# Patient Record
Sex: MALE | Race: ASIAN | NOT HISPANIC OR LATINO | URBAN - METROPOLITAN AREA
[De-identification: names, ages, dates, MRNs, and addresses within clinical notes are randomized per-mention and may not be internally consistent; named-entity substitution may affect disease eponyms.]

---

## 2024-01-04 RX ORDER — SODIUM CHLORIDE 9 MG/ML
250 INJECTION INTRAMUSCULAR; INTRAVENOUS; SUBCUTANEOUS ONCE
Refills: 0 | Status: COMPLETED | OUTPATIENT
Start: 2024-01-10 | End: 2024-01-10

## 2024-01-04 RX ORDER — CHOLECALCIFEROL (VITAMIN D3) 125 MCG
1 CAPSULE ORAL
Refills: 0 | DISCHARGE

## 2024-01-04 RX ORDER — SODIUM CHLORIDE 9 MG/ML
500 INJECTION INTRAMUSCULAR; INTRAVENOUS; SUBCUTANEOUS
Refills: 0 | Status: DISCONTINUED | OUTPATIENT
Start: 2024-01-10 | End: 2024-01-24

## 2024-01-04 RX ORDER — CLOPIDOGREL BISULFATE 75 MG/1
600 TABLET, FILM COATED ORAL ONCE
Refills: 0 | Status: COMPLETED | OUTPATIENT
Start: 2024-01-10 | End: 2024-01-10

## 2024-01-04 RX ORDER — CHLORHEXIDINE GLUCONATE 213 G/1000ML
1 SOLUTION TOPICAL ONCE
Refills: 0 | Status: DISCONTINUED | OUTPATIENT
Start: 2024-01-10 | End: 2024-01-24

## 2024-01-04 NOTE — H&P ADULT - NSHPLABSRESULTS_GEN_ALL_CORE
16.6   8.58  )-----------( 226      ( 10 Frederick 2024 14:20 )             49.0       01-10    142  |  106  |  22  ----------------------------<  121<H>  3.7   |  23  |  1.37<H>    Ca    9.5      10 Frederick 2024 14:20    TPro  7.4  /  Alb  4.2  /  TBili  0.6  /  DBili  x   /  AST  24  /  ALT  17  /  AlkPhos  81  01-10      PT/INR - ( 10 Frederick 2024 14:20 )   PT: 10.6 sec;   INR: 0.93          PTT - ( 10 Frederick 2024 14:20 )  PTT:34.2 sec    CARDIAC MARKERS ( 10 Frederick 2024 14:20 )  x     / x     / 70 U/L / x     / 2.0 ng/mL        Urinalysis Basic - ( 10 Frederick 2024 14:20 )    Color: x / Appearance: x / SG: x / pH: x  Gluc: 121 mg/dL / Ketone: x  / Bili: x / Urobili: x   Blood: x / Protein: x / Nitrite: x   Leuk Esterase: x / RBC: x / WBC x   Sq Epi: x / Non Sq Epi: x / Bacteria: x        EKG: NSR, non ischemic

## 2024-01-04 NOTE — H&P ADULT - NSICDXPASTMEDICALHX_GEN_ALL_CORE_FT
PAST MEDICAL HISTORY:  Hyperlipidemia     Hypertension      PAST MEDICAL HISTORY:  Gastric cancer     Hyperlipidemia     Hypertension

## 2024-01-04 NOTE — H&P ADULT - HISTORY OF PRESENT ILLNESS
[facility]     Cardiologist:  Dr. John Chance   Escort: To be determined on arrival   Pharmacy:  [facility]     CONFIRM MEDICATIONS UPON ARRIVAL     Patient is a 79 y/o F,  who presented to her cardiologist, Dr. Chance, with complaints of intermittent, non-radiating, substernal chest pain, that has been intermittent, but gradually worsening over the past few weeks, occurring with moderate exertion (walking less than 2 blocks or climbing less than 1 flight of stairs), and alleviated with rest. Patient also complains of  moderate dyspnea for the past 1 month precipitated by light exertion and alleviated by rest. Patient also complains of dizziness that is gradually worsening described as lightheadedness.   Patient denies SOB, palpitations, fatigue, dizziness, headache, chills, orthopnea, LE edema, cough, PND, recent travel, or sick contacts.   ECHO 12/17/23:  LA/RA size is normal. LVSF is normal. RVSF is normal. Mild AI. Trace MR. Trace TR. EF: 60%. NST 12/11/2023: Overall quality of the test is good. A moderate perfusion abnormality of mild intensity is present in the inferior and inferolateral regions on the stress images which were reversible in the rest images.     In light of patient's risk factors, CCS Class III anginal symptoms, and abnormal NST, patient presents for left heart cardiac catheterization with possible intervention if clinically indicated.  [facility]     Cardiologist:  Dr. John Chance   Escort: To be determined on arrival   Pharmacy:  [facility]     CONFIRM MEDICATIONS UPON ARRIVAL     Patient is a 77 y/o F,  who presented to her cardiologist, Dr. Chance, with complaints of intermittent, non-radiating, substernal chest pain, that has been intermittent, but gradually worsening over the past few weeks, occurring with moderate exertion (walking less than 2 blocks or climbing less than 1 flight of stairs), and alleviated with rest. Patient also complains of  moderate dyspnea for the past 1 month precipitated by light exertion and alleviated by rest. Patient also complains of dizziness that is gradually worsening described as lightheadedness.   Patient denies SOB, palpitations, fatigue, dizziness, headache, chills, orthopnea, LE edema, cough, PND, recent travel, or sick contacts.   ECHO 12/17/23:  LA/RA size is normal. LVSF is normal. RVSF is normal. Mild AI. Trace MR. Trace TR. EF: 60%. NST 12/11/2023: Overall quality of the test is good. A moderate perfusion abnormality of mild intensity is present in the inferior and inferolateral regions on the stress images which were reversible in the rest images.     In light of patient's risk factors, CCS Class III anginal symptoms, and abnormal NST, patient presents for left heart cardiac catheterization with possible intervention if clinically indicated.  Cardiologist - Dr. John Chance  Pharmacy - [facility]   Escort -     78F, __ speaking, w/ PMHx of HTN, and HLD, initially presented to outpt cardiologist c/o intermittent, non-radiating, substernal chest pressure, that has been gradually worsening over the past few weeks, occurring w/ moderate exertion and alleviated with rest. Pt also endorses of  mod dyspnea for the past 1 month precipitated by light exertion and alleviated by rest. Pt also c/o dizziness that is gradually worsening described as lightheadedness. She denies any palpitations, syncope, diaphoresis, fatigue, LE edema, orthopnea, PND, N/V, fever, chills or recent sick contact.     NST 12/11/2023: mod ischemia in inferior and inferolateral regions, reversible at rest. TTE 12/17/23: LVEF normal, no significant valvular dz    In light of patient's risk factors, CCS Class III anginal symptoms, and abnormal NST, patient presents for left heart cardiac catheterization with possible intervention if clinically indicated.  Cardiologist - Dr. John Chance  Pharmacy - [facility]   Escort -     78F, __ speaking, w/ PMHx of HTN, and HLD, initially presented to outpt cardiologist c/o intermittent, non-radiating, substernal chest pressure, that has been gradually worsening over the past few weeks, occurring w/ moderate exertion and alleviated with rest. Pt also endorses of  mod dyspnea for the past 1 month precipitated by light exertion and alleviated by rest. Pt also c/o dizziness that is gradually worsening described as lightheadedness. She denies any palpitations, syncope, diaphoresis, fatigue, LE edema, orthopnea, PND, N/V, fever, chills or recent sick contact.     NST 12/11/2023: mod ischemia in inferior and inferolateral regions, reversible at rest. TTE 12/17/23: LVEF normal, no significant valvular dz    In light of patient's risk factors, CCS Class III anginal symptoms, and abnormal NST, patient presents for left heart cardiac catheterization with possible intervention if clinically indicated.  Cardiologist - Dr. John Chanec  Pharmacy - [facility]   Escort -     78F, __ speaking, w/ PMHx of HTN, HLD, and Gastric CA s/p partial gastrectomy/chemo/radiation (2008 @ [facility]), initially presented to outpt cardiologist c/o intermittent, non-radiating, substernal chest pressure, that has been gradually worsening over the past few weeks, occurring w/ moderate exertion and alleviated with rest. Pt also endorses of  mod dyspnea for the past 1 month precipitated by light exertion and alleviated by rest. Pt also c/o dizziness that is gradually worsening described as lightheadedness. She denies any palpitations, syncope, diaphoresis, fatigue, LE edema, orthopnea, PND, N/V, fever, chills or recent sick contact.     NST 12/11/2023: mod ischemia in inferior and inferolateral regions, reversible at rest. TTE 12/17/23: LVEF normal, no significant valvular dz    In light of patient's risk factors, CCS Class III anginal symptoms, and abnormal NST, patient presents for left heart cardiac catheterization with possible intervention if clinically indicated.  Cardiologist - Dr. John Chance  Pharmacy - [facility]   Escort -     78F, __ speaking, w/ PMHx of HTN, HLD, and Gastric CA s/p partial gastrectomy/chemo/radiation (2008 @ [facility]), initially presented to outpt cardiologist c/o intermittent, non-radiating, substernal chest pressure, that has been gradually worsening over the past few weeks, occurring w/ moderate exertion and alleviated with rest. Pt also endorses of  mod dyspnea for the past 1 month precipitated by light exertion and alleviated by rest. Pt also c/o dizziness that is gradually worsening described as lightheadedness. She denies any palpitations, syncope, diaphoresis, fatigue, LE edema, orthopnea, PND, N/V, fever, chills or recent sick contact.     NST 12/11/2023: mod ischemia in inferior and inferolateral regions, reversible at rest. TTE 12/17/23: LVEF normal, no significant valvular dz    In light of patient's risk factors, CCS Class III anginal symptoms, and abnormal NST, patient presents for left heart cardiac catheterization with possible intervention if clinically indicated.  Cardiologist - Dr. John Chance  Pharmacy - [facility]   Escort -     78F, __ speaking, w/ PMHx of HTN, HLD, and Gastric CA s/p partial gastrectomy/chemo/radiation (2008 @ [facility]), initially presented to outpt cardiologist c/o intermittent, non-radiating, substernal chest pressure, that has been gradually worsening over the past few weeks, occurring w/ moderate exertion and alleviated with rest. Pt also endorses of  mod dyspnea for the past 1 month precipitated by light exertion and alleviated by rest. Pt also c/o dizziness that is gradually worsening described as lightheadedness. She denies any palpitations, syncope, diaphoresis, fatigue, LE edema, orthopnea, PND, N/V, fever, chills or recent sick contact.     NST 12/11/2023: mod ischemia in inferior and inferolateral regions, reversible at rest.  TTE 12/17/23: LVEF normal, no significant valvular dz    In light of patient's risk factors, CCS Class III anginal symptoms, and abnormal NST, patient presents for left heart cardiac catheterization with possible intervention if clinically indicated.  Cardiologist - Dr. John Chance  Pharmacy - [facility]   Escort -     78F, __ speaking, w/ PMHx of HTN, HLD, and Gastric CA s/p partial gastrectomy/chemo/radiation (2008 @ [facility]), initially presented to outpt cardiologist c/o intermittent, non-radiating, substernal chest pressure, that has been gradually worsening over the past few weeks, occurring w/ moderate exertion and alleviated with rest. Pt also endorses of  mod dyspnea for the past 1 month precipitated by light exertion and alleviated by rest. Pt also c/o dizziness that is gradually worsening described as lightheadedness. She denies any palpitations, syncope, diaphoresis, fatigue, LE edema, orthopnea, PND, N/V, fever, chills or recent sick contact.     NST 12/11/2023: mod ischemia in inferior and inferolateral regions, reversible at rest.  TTE 12/17/23: LVEF normal, no significant valvular dz    In light of patient's risk factors, CCS Class III anginal symptoms, and abnormal NST, patient presents for left heart cardiac catheterization with possible intervention if clinically indicated.  Cardiologist - Dr. John Chance  Pharmacy - [facility] [phone]  Escort - Family    78M, Cantonese speaking, w/ PMHx of HTN, HLD, Gastric CA s/p partial gastrectomy/chemo/radiation (2008 @ [facility]), and CKD-III (most recent Cr 1.38 on 12/18/23), initially presented to outpt cardiologist c/o intermittent, non-radiating, substernal chest pressure, that has been gradually worsening over the past few weeks, occurring w/ moderate exertion and alleviated with rest. Pt also endorses of  mod dyspnea for the past 1 month precipitated by light exertion and alleviated by rest. Pt also c/o dizziness that is gradually worsening described as lightheadedness. He denies any palpitations, syncope, diaphoresis, fatigue, LE edema, orthopnea, PND, N/V, fever, chills or recent sick contact.     NST 12/11/2023: mod ischemia in inferior and inferolateral regions, reversible at rest.  TTE 12/17/23: LVEF normal, no significant valvular dz    In light of patient's risk factors, CCS Class III anginal symptoms, and abnormal NST, patient presents for left heart cardiac catheterization with possible intervention if clinically indicated.  Cardiologist - Dr. John Chance  Pharmacy - [facility] [phone]  Escort - Family    78M, Cantonese speaking, w/ PMHx of HTN, HLD, Gastric CA s/p partial gastrectomy/chemo/radiation (2008 @ [facility]), and CKD-III (most recent Cr 1.38 on 12/18/23), initially presented to outpt cardiologist c/o intermittent, non-radiating, substernal chest pressure, that has been gradually worsening over the past few weeks, occurring w/ moderate exertion and alleviated with rest. Pt also endorses of  mod dyspnea for the past 1 month precipitated by light exertion and alleviated by rest. Pt also c/o dizziness that is gradually worsening described as lightheadedness. He denies any palpitations, syncope, diaphoresis, fatigue, LE edema, orthopnea, PND, N/V, fever, chills or recent sick contact.     NST 12/11/2023: mod ischemia in inferior and inferolateral regions, reversible at rest.  TTE 12/17/23: LVEF normal, no significant valvular dz    In light of patient's risk factors, CCS Class III anginal symptoms, and abnormal NST, patient presents for left heart cardiac catheterization with possible intervention if clinically indicated.

## 2024-01-04 NOTE — H&P ADULT - ASSESSMENT
78F, __ speaking, w/ PMHx of HTN, HLD, and Gastric CA s/p partial gastrectomy/chemo/radiation (2008 @ [facility]), now presents to [facility] for recommended cardiac cath w/ possible intervention if clinically indicated, in light of pts risk factors, CCS class III anginal symptoms and abnormal NST.    - ASA 3, Mallampati 3  - Pt compliant w/ home ASA 81mg, last dose ____. Load w/ Plavix 600mg prior to cath.  - Pre cath IVF Hydration: NS 250cc bolus then c/w maintenance NS @ 75cc/hr x 2hrs  - Pre cath consented    Risks & benefits of procedure and alternative therapy have been explained to the patient including but not limited to: allergic reaction, bleeding w/possible need for blood transfusion, infection, renal and vascular compromise, limb damage, arrhythmia, stroke, vessel dissection/perforation, Myocardial infarction, emergent CABG. Informed consent obtained and in chart.  78F, __ speaking, w/ PMHx of HTN, HLD, and Gastric CA s/p partial gastrectomy/chemo/radiation (2008 @ [facility]), now presents to [facility] for recommended cardiac cath w/ possible intervention if clinically indicated, in light of pts risk factors, CCS class III anginal symptoms and abnormal NST.    - ASA 3, Mallampati 3  - Pt compliant w/ home ASA 81mg, last dose ____. Load w/ Plavix 600mg prior to cath.  - Pre cath IVF Hydration: NS 250cc bolus then c/w maintenance NS @ 75cc/hr x 2hrs  - Pre cath consented    Risks & benefits of procedure and alternative therapy have been explained to the patient including but not limited to: allergic reaction, bleeding w/possible need for blood transfusion, infection, renal and vascular compromise, limb damage, arrhythmia, stroke, vessel dissection/perforation, Myocardial infarction, emergent CABG. Informed consent obtained and in chart.  78M, Cantonese speaking, w/ PMHx of HTN, HLD, Gastric CA s/p partial gastrectomy/chemo/radiation (2008 @ [facility]), and CKD-III (most recent Cr 1.38 on 12/18/23), now presents to [facility] for recommended cardiac cath w/ possible intervention if clinically indicated, in light of pts risk factors, CCS class III anginal symptoms and abnormal NST.    - ASA 3, Mallampati 3  - Pt compliant w/ home ASA 81mg, last dose 1/10/24. Load w/ Plavix 600mg prior to cath.  - Pre cath IVF Hydration: NS 250cc bolus then c/w maintenance NS @ 75cc/hr x 2hrs  - Pre cath consented via language line solutions #[id number]    Risks & benefits of procedure and alternative therapy have been explained to the patient including but not limited to: allergic reaction, bleeding w/possible need for blood transfusion, infection, renal and vascular compromise, limb damage, arrhythmia, stroke, vessel dissection/perforation, Myocardial infarction, emergent CABG. Informed consent obtained and in chart.  78M, Cantonese speaking, w/ PMHx of HTN, HLD, Gastric CA s/p partial gastrectomy/chemo/radiation (2008 @ [facility]), and CKD-III (most recent Cr 1.38 on 12/18/23), now presents to [facility] for recommended cardiac cath w/ possible intervention if clinically indicated, in light of pts risk factors, CCS class III anginal symptoms and abnormal NST.    - ASA 3, Mallampati 3  - Pt compliant w/ home ASA 81mg, last dose 1/10/24. Load w/ Plavix 600mg prior to cath.  - Pre cath IVF Hydration: NS 250cc bolus then c/w maintenance NS @ 75cc/hr x 2hrs  - Pre cath consented via language line solutions #[id number]    Risks & benefits of procedure and alternative therapy have been explained to the patient including but not limited to: allergic reaction, bleeding w/possible need for blood transfusion, infection, renal and vascular compromise, limb damage, arrhythmia, stroke, vessel dissection/perforation, Myocardial infarction, emergent CABG. Informed consent obtained and in chart.  78M, Cantonese speaking, w/ PMHx of HTN, HLD, Gastric CA s/p partial gastrectomy/chemo/radiation (2008 @ [facility]), and CKD-III (most recent Cr 1.38 on 12/18/23), now presents to [facility] for recommended cardiac cath w/ possible intervention if clinically indicated, in light of pts risk factors, CCS class III anginal symptoms and abnormal NST.    - ASA 3, Mallampati 3  - Pt compliant w/ home ASA 81mg, last dose 1/10/24. Load w/ Plavix 600mg prior to cath.  - Cr 1.37 (at baseline), pre cath IVF Hydration: NS 250cc bolus then c/w maintenance NS @ 75cc/hr x 2hrs  - Pre cath consented via language line solutions #[id number]    Risks & benefits of procedure and alternative therapy have been explained to the patient including but not limited to: allergic reaction, bleeding w/possible need for blood transfusion, infection, renal and vascular compromise, limb damage, arrhythmia, stroke, vessel dissection/perforation, Myocardial infarction, emergent CABG. Informed consent obtained and in chart.  78M, Cantonese speaking, w/ PMHx of HTN, HLD, Gastric CA s/p partial gastrectomy/chemo/radiation (2008 @ [facility]), and CKD-III (most recent Cr 1.38 on 12/18/23), now presents to [facility] for recommended cardiac cath w/ possible intervention if clinically indicated, in light of pts risk factors, CCS class III anginal symptoms and abnormal NST.    - ASA 3, Mallampati 3  - Pt compliant w/ home ASA 81mg, last dose 1/10/24. Load w/ Plavix 600mg prior to cath.  - Cr 1.37 (at baseline), pre cath IVF Hydration: NS 250cc bolus then c/w maintenance NS @ 75cc/hr x 2hrs  - Pre cath consented via language line solutions #[id number]    Risks & benefits of procedure and alternative therapy have been explained to the patient including but not limited to: allergic reaction, bleeding w/possible need for blood transfusion, infection, renal and vascular compromise, limb damage, arrhythmia, stroke, vessel dissection/perforation, Myocardial infarction, emergent CABG. Informed consent obtained and in chart.

## 2024-01-10 ENCOUNTER — OUTPATIENT (OUTPATIENT)
Dept: OUTPATIENT SERVICES | Facility: HOSPITAL | Age: 79
LOS: 1 days | Discharge: HOME | End: 2024-01-10
Payer: MEDICARE

## 2024-01-10 DIAGNOSIS — Z90.3 ACQUIRED ABSENCE OF STOMACH [PART OF]: Chronic | ICD-10-CM

## 2024-01-10 LAB
A1C WITH ESTIMATED AVERAGE GLUCOSE RESULT: 6 % — HIGH (ref 4–5.6)
A1C WITH ESTIMATED AVERAGE GLUCOSE RESULT: 6 % — HIGH (ref 4–5.6)
ALBUMIN SERPL ELPH-MCNC: 4.2 G/DL — SIGNIFICANT CHANGE UP (ref 3.3–5)
ALBUMIN SERPL ELPH-MCNC: 4.2 G/DL — SIGNIFICANT CHANGE UP (ref 3.3–5)
ALP SERPL-CCNC: 81 U/L — SIGNIFICANT CHANGE UP (ref 40–120)
ALP SERPL-CCNC: 81 U/L — SIGNIFICANT CHANGE UP (ref 40–120)
ALT FLD-CCNC: 17 U/L — SIGNIFICANT CHANGE UP (ref 10–45)
ALT FLD-CCNC: 17 U/L — SIGNIFICANT CHANGE UP (ref 10–45)
ANION GAP SERPL CALC-SCNC: 13 MMOL/L — SIGNIFICANT CHANGE UP (ref 5–17)
ANION GAP SERPL CALC-SCNC: 13 MMOL/L — SIGNIFICANT CHANGE UP (ref 5–17)
APTT BLD: 34.2 SEC — SIGNIFICANT CHANGE UP (ref 24.5–35.6)
APTT BLD: 34.2 SEC — SIGNIFICANT CHANGE UP (ref 24.5–35.6)
AST SERPL-CCNC: 24 U/L — SIGNIFICANT CHANGE UP (ref 10–40)
AST SERPL-CCNC: 24 U/L — SIGNIFICANT CHANGE UP (ref 10–40)
BASOPHILS # BLD AUTO: 0.04 K/UL — SIGNIFICANT CHANGE UP (ref 0–0.2)
BASOPHILS # BLD AUTO: 0.04 K/UL — SIGNIFICANT CHANGE UP (ref 0–0.2)
BASOPHILS NFR BLD AUTO: 0.5 % — SIGNIFICANT CHANGE UP (ref 0–2)
BASOPHILS NFR BLD AUTO: 0.5 % — SIGNIFICANT CHANGE UP (ref 0–2)
BILIRUB SERPL-MCNC: 0.6 MG/DL — SIGNIFICANT CHANGE UP (ref 0.2–1.2)
BILIRUB SERPL-MCNC: 0.6 MG/DL — SIGNIFICANT CHANGE UP (ref 0.2–1.2)
BUN SERPL-MCNC: 22 MG/DL — SIGNIFICANT CHANGE UP (ref 7–23)
BUN SERPL-MCNC: 22 MG/DL — SIGNIFICANT CHANGE UP (ref 7–23)
CALCIUM SERPL-MCNC: 9.5 MG/DL — SIGNIFICANT CHANGE UP (ref 8.4–10.5)
CALCIUM SERPL-MCNC: 9.5 MG/DL — SIGNIFICANT CHANGE UP (ref 8.4–10.5)
CHLORIDE SERPL-SCNC: 106 MMOL/L — SIGNIFICANT CHANGE UP (ref 96–108)
CHLORIDE SERPL-SCNC: 106 MMOL/L — SIGNIFICANT CHANGE UP (ref 96–108)
CHOLEST SERPL-MCNC: 162 MG/DL — SIGNIFICANT CHANGE UP
CHOLEST SERPL-MCNC: 162 MG/DL — SIGNIFICANT CHANGE UP
CK MB CFR SERPL CALC: 2 NG/ML — SIGNIFICANT CHANGE UP (ref 0–6.7)
CK MB CFR SERPL CALC: 2 NG/ML — SIGNIFICANT CHANGE UP (ref 0–6.7)
CK SERPL-CCNC: 70 U/L — SIGNIFICANT CHANGE UP (ref 30–200)
CK SERPL-CCNC: 70 U/L — SIGNIFICANT CHANGE UP (ref 30–200)
CO2 SERPL-SCNC: 23 MMOL/L — SIGNIFICANT CHANGE UP (ref 22–31)
CO2 SERPL-SCNC: 23 MMOL/L — SIGNIFICANT CHANGE UP (ref 22–31)
CREAT SERPL-MCNC: 1.37 MG/DL — HIGH (ref 0.5–1.3)
CREAT SERPL-MCNC: 1.37 MG/DL — HIGH (ref 0.5–1.3)
EGFR: 53 ML/MIN/1.73M2 — LOW
EGFR: 53 ML/MIN/1.73M2 — LOW
EOSINOPHIL # BLD AUTO: 0.06 K/UL — SIGNIFICANT CHANGE UP (ref 0–0.5)
EOSINOPHIL # BLD AUTO: 0.06 K/UL — SIGNIFICANT CHANGE UP (ref 0–0.5)
EOSINOPHIL NFR BLD AUTO: 0.7 % — SIGNIFICANT CHANGE UP (ref 0–6)
EOSINOPHIL NFR BLD AUTO: 0.7 % — SIGNIFICANT CHANGE UP (ref 0–6)
ESTIMATED AVERAGE GLUCOSE: 126 MG/DL — HIGH (ref 68–114)
ESTIMATED AVERAGE GLUCOSE: 126 MG/DL — HIGH (ref 68–114)
GLUCOSE SERPL-MCNC: 121 MG/DL — HIGH (ref 70–99)
GLUCOSE SERPL-MCNC: 121 MG/DL — HIGH (ref 70–99)
HCT VFR BLD CALC: 49 % — SIGNIFICANT CHANGE UP (ref 39–50)
HCT VFR BLD CALC: 49 % — SIGNIFICANT CHANGE UP (ref 39–50)
HCV AB S/CO SERPL IA: 0.03 S/CO — SIGNIFICANT CHANGE UP
HCV AB S/CO SERPL IA: 0.03 S/CO — SIGNIFICANT CHANGE UP
HCV AB SERPL-IMP: SIGNIFICANT CHANGE UP
HCV AB SERPL-IMP: SIGNIFICANT CHANGE UP
HDLC SERPL-MCNC: 68 MG/DL — SIGNIFICANT CHANGE UP
HDLC SERPL-MCNC: 68 MG/DL — SIGNIFICANT CHANGE UP
HGB BLD-MCNC: 16.6 G/DL — SIGNIFICANT CHANGE UP (ref 13–17)
HGB BLD-MCNC: 16.6 G/DL — SIGNIFICANT CHANGE UP (ref 13–17)
IMM GRANULOCYTES NFR BLD AUTO: 0.6 % — SIGNIFICANT CHANGE UP (ref 0–0.9)
IMM GRANULOCYTES NFR BLD AUTO: 0.6 % — SIGNIFICANT CHANGE UP (ref 0–0.9)
INR BLD: 0.93 — SIGNIFICANT CHANGE UP (ref 0.85–1.18)
INR BLD: 0.93 — SIGNIFICANT CHANGE UP (ref 0.85–1.18)
LIPID PNL WITH DIRECT LDL SERPL: 81 MG/DL — SIGNIFICANT CHANGE UP
LIPID PNL WITH DIRECT LDL SERPL: 81 MG/DL — SIGNIFICANT CHANGE UP
LYMPHOCYTES # BLD AUTO: 2.25 K/UL — SIGNIFICANT CHANGE UP (ref 1–3.3)
LYMPHOCYTES # BLD AUTO: 2.25 K/UL — SIGNIFICANT CHANGE UP (ref 1–3.3)
LYMPHOCYTES # BLD AUTO: 26.2 % — SIGNIFICANT CHANGE UP (ref 13–44)
LYMPHOCYTES # BLD AUTO: 26.2 % — SIGNIFICANT CHANGE UP (ref 13–44)
MCHC RBC-ENTMCNC: 31.4 PG — SIGNIFICANT CHANGE UP (ref 27–34)
MCHC RBC-ENTMCNC: 31.4 PG — SIGNIFICANT CHANGE UP (ref 27–34)
MCHC RBC-ENTMCNC: 33.9 GM/DL — SIGNIFICANT CHANGE UP (ref 32–36)
MCHC RBC-ENTMCNC: 33.9 GM/DL — SIGNIFICANT CHANGE UP (ref 32–36)
MCV RBC AUTO: 92.8 FL — SIGNIFICANT CHANGE UP (ref 80–100)
MCV RBC AUTO: 92.8 FL — SIGNIFICANT CHANGE UP (ref 80–100)
MONOCYTES # BLD AUTO: 0.67 K/UL — SIGNIFICANT CHANGE UP (ref 0–0.9)
MONOCYTES # BLD AUTO: 0.67 K/UL — SIGNIFICANT CHANGE UP (ref 0–0.9)
MONOCYTES NFR BLD AUTO: 7.8 % — SIGNIFICANT CHANGE UP (ref 2–14)
MONOCYTES NFR BLD AUTO: 7.8 % — SIGNIFICANT CHANGE UP (ref 2–14)
NEUTROPHILS # BLD AUTO: 5.51 K/UL — SIGNIFICANT CHANGE UP (ref 1.8–7.4)
NEUTROPHILS # BLD AUTO: 5.51 K/UL — SIGNIFICANT CHANGE UP (ref 1.8–7.4)
NEUTROPHILS NFR BLD AUTO: 64.2 % — SIGNIFICANT CHANGE UP (ref 43–77)
NEUTROPHILS NFR BLD AUTO: 64.2 % — SIGNIFICANT CHANGE UP (ref 43–77)
NON HDL CHOLESTEROL: 94 MG/DL — SIGNIFICANT CHANGE UP
NON HDL CHOLESTEROL: 94 MG/DL — SIGNIFICANT CHANGE UP
NRBC # BLD: 0 /100 WBCS — SIGNIFICANT CHANGE UP (ref 0–0)
NRBC # BLD: 0 /100 WBCS — SIGNIFICANT CHANGE UP (ref 0–0)
PLATELET # BLD AUTO: 226 K/UL — SIGNIFICANT CHANGE UP (ref 150–400)
PLATELET # BLD AUTO: 226 K/UL — SIGNIFICANT CHANGE UP (ref 150–400)
POTASSIUM SERPL-MCNC: 3.7 MMOL/L — SIGNIFICANT CHANGE UP (ref 3.5–5.3)
POTASSIUM SERPL-MCNC: 3.7 MMOL/L — SIGNIFICANT CHANGE UP (ref 3.5–5.3)
POTASSIUM SERPL-SCNC: 3.7 MMOL/L — SIGNIFICANT CHANGE UP (ref 3.5–5.3)
POTASSIUM SERPL-SCNC: 3.7 MMOL/L — SIGNIFICANT CHANGE UP (ref 3.5–5.3)
PROT SERPL-MCNC: 7.4 G/DL — SIGNIFICANT CHANGE UP (ref 6–8.3)
PROT SERPL-MCNC: 7.4 G/DL — SIGNIFICANT CHANGE UP (ref 6–8.3)
PROTHROM AB SERPL-ACNC: 10.6 SEC — SIGNIFICANT CHANGE UP (ref 9.5–13)
PROTHROM AB SERPL-ACNC: 10.6 SEC — SIGNIFICANT CHANGE UP (ref 9.5–13)
RBC # BLD: 5.28 M/UL — SIGNIFICANT CHANGE UP (ref 4.2–5.8)
RBC # BLD: 5.28 M/UL — SIGNIFICANT CHANGE UP (ref 4.2–5.8)
RBC # FLD: 14 % — SIGNIFICANT CHANGE UP (ref 10.3–14.5)
RBC # FLD: 14 % — SIGNIFICANT CHANGE UP (ref 10.3–14.5)
SODIUM SERPL-SCNC: 142 MMOL/L — SIGNIFICANT CHANGE UP (ref 135–145)
SODIUM SERPL-SCNC: 142 MMOL/L — SIGNIFICANT CHANGE UP (ref 135–145)
TRIGL SERPL-MCNC: 66 MG/DL — SIGNIFICANT CHANGE UP
TRIGL SERPL-MCNC: 66 MG/DL — SIGNIFICANT CHANGE UP
WBC # BLD: 8.58 K/UL — SIGNIFICANT CHANGE UP (ref 3.8–10.5)
WBC # BLD: 8.58 K/UL — SIGNIFICANT CHANGE UP (ref 3.8–10.5)
WBC # FLD AUTO: 8.58 K/UL — SIGNIFICANT CHANGE UP (ref 3.8–10.5)
WBC # FLD AUTO: 8.58 K/UL — SIGNIFICANT CHANGE UP (ref 3.8–10.5)

## 2024-01-10 PROCEDURE — 93454 CORONARY ARTERY ANGIO S&I: CPT | Mod: 26

## 2024-01-10 RX ORDER — ASPIRIN/CALCIUM CARB/MAGNESIUM 324 MG
1 TABLET ORAL
Refills: 0 | DISCHARGE

## 2024-01-10 RX ORDER — AMLODIPINE BESYLATE 2.5 MG/1
1 TABLET ORAL
Refills: 0 | DISCHARGE

## 2024-01-10 RX ORDER — ASPIRIN/CALCIUM CARB/MAGNESIUM 324 MG
81 TABLET ORAL DAILY
Refills: 0 | Status: DISCONTINUED | OUTPATIENT
Start: 2024-01-10 | End: 2024-01-10

## 2024-01-10 RX ORDER — FAMOTIDINE 10 MG/ML
1 INJECTION INTRAVENOUS
Refills: 0 | DISCHARGE

## 2024-01-10 RX ORDER — ICOSAPENT ETHYL 500 MG/1
2 CAPSULE, LIQUID FILLED ORAL
Refills: 0 | DISCHARGE

## 2024-01-10 RX ORDER — DAPAGLIFLOZIN 10 MG/1
1 TABLET, FILM COATED ORAL
Refills: 0 | DISCHARGE

## 2024-01-10 RX ORDER — LINACLOTIDE 145 UG/1
1 CAPSULE, GELATIN COATED ORAL
Refills: 0 | DISCHARGE

## 2024-01-10 RX ORDER — ATORVASTATIN CALCIUM 80 MG/1
1 TABLET, FILM COATED ORAL
Refills: 0 | DISCHARGE

## 2024-01-10 RX ORDER — SODIUM CHLORIDE 9 MG/ML
1000 INJECTION INTRAMUSCULAR; INTRAVENOUS; SUBCUTANEOUS
Refills: 0 | Status: DISCONTINUED | OUTPATIENT
Start: 2024-01-10 | End: 2024-01-24

## 2024-01-10 RX ADMIN — SODIUM CHLORIDE 250 MILLILITER(S): 9 INJECTION INTRAMUSCULAR; INTRAVENOUS; SUBCUTANEOUS at 15:07

## 2024-01-10 RX ADMIN — SODIUM CHLORIDE 75 MILLILITER(S): 9 INJECTION INTRAMUSCULAR; INTRAVENOUS; SUBCUTANEOUS at 15:07

## 2024-01-10 RX ADMIN — CLOPIDOGREL BISULFATE 600 MILLIGRAM(S): 75 TABLET, FILM COATED ORAL at 15:06

## 2024-01-10 NOTE — PROGRESS NOTE ADULT - SUBJECTIVE AND OBJECTIVE BOX
Interventional Cardiology PA SDA Discharge Note    Patient without complaints. Ambulated and voided without difficulties  Afebrile, VSS  Ext:  Right Radial :  no  hematoma,   no  bleeding, dressing; C/D/I  Pulses:    intact RAD to baseline     A/P:      s/p diagnostic LHC (1/10/24) revealing minimal, non-obstructive disease.   ACCESS: right radial access    1.	Stable for discharge as per attending Dr. Pineda after bed rest, pt voids, groin/wrist stable and 30 minutes of ambulation.  2.	Follow-up with PMD/Cardiologist Dr. Pineda in 1-2 weeks  3.	Discharged forms signed and copies in chart

## 2024-01-11 PROCEDURE — 86803 HEPATITIS C AB TEST: CPT

## 2024-01-11 PROCEDURE — C1887: CPT

## 2024-01-11 PROCEDURE — C1769: CPT

## 2024-01-11 PROCEDURE — 82553 CREATINE MB FRACTION: CPT

## 2024-01-11 PROCEDURE — 80053 COMPREHEN METABOLIC PANEL: CPT

## 2024-01-11 PROCEDURE — 85610 PROTHROMBIN TIME: CPT

## 2024-01-11 PROCEDURE — 80061 LIPID PANEL: CPT

## 2024-01-11 PROCEDURE — 83036 HEMOGLOBIN GLYCOSYLATED A1C: CPT

## 2024-01-11 PROCEDURE — 85730 THROMBOPLASTIN TIME PARTIAL: CPT

## 2024-01-11 PROCEDURE — 82550 ASSAY OF CK (CPK): CPT

## 2024-01-11 PROCEDURE — C1894: CPT

## 2024-01-11 PROCEDURE — 93454 CORONARY ARTERY ANGIO S&I: CPT

## 2024-01-11 PROCEDURE — 85025 COMPLETE CBC W/AUTO DIFF WBC: CPT

## 2024-01-12 DIAGNOSIS — R93.1 ABNORMAL FINDINGS ON DIAGNOSTIC IMAGING OF HEART AND CORONARY CIRCULATION: ICD-10-CM

## 2024-01-12 DIAGNOSIS — I25.10 ATHEROSCLEROTIC HEART DISEASE OF NATIVE CORONARY ARTERY WITHOUT ANGINA PECTORIS: ICD-10-CM

## 2024-01-23 PROBLEM — C16.9 MALIGNANT NEOPLASM OF STOMACH, UNSPECIFIED: Chronic | Status: ACTIVE | Noted: 2024-01-10

## 2024-01-23 PROBLEM — E78.5 HYPERLIPIDEMIA, UNSPECIFIED: Chronic | Status: ACTIVE | Noted: 2024-01-04

## 2024-01-23 PROBLEM — I10 ESSENTIAL (PRIMARY) HYPERTENSION: Chronic | Status: ACTIVE | Noted: 2024-01-04

## 2024-01-23 PROBLEM — Z00.00 ENCOUNTER FOR PREVENTIVE HEALTH EXAMINATION: Status: ACTIVE | Noted: 2024-01-23

## 2024-01-24 ENCOUNTER — APPOINTMENT (OUTPATIENT)
Dept: GASTROENTEROLOGY | Facility: CLINIC | Age: 79
End: 2024-01-24
Payer: MEDICARE

## 2024-01-24 VITALS
BODY MASS INDEX: 22.52 KG/M2 | HEIGHT: 62 IN | HEART RATE: 88 BPM | DIASTOLIC BLOOD PRESSURE: 55 MMHG | OXYGEN SATURATION: 99 % | SYSTOLIC BLOOD PRESSURE: 138 MMHG | WEIGHT: 122.4 LBS

## 2024-01-24 DIAGNOSIS — Z86.39 PERSONAL HISTORY OF OTHER ENDOCRINE, NUTRITIONAL AND METABOLIC DISEASE: ICD-10-CM

## 2024-01-24 DIAGNOSIS — C16.9 MALIGNANT NEOPLASM OF STOMACH, UNSPECIFIED: ICD-10-CM

## 2024-01-24 DIAGNOSIS — Z80.0 FAMILY HISTORY OF MALIGNANT NEOPLASM OF DIGESTIVE ORGANS: ICD-10-CM

## 2024-01-24 DIAGNOSIS — Z78.9 OTHER SPECIFIED HEALTH STATUS: ICD-10-CM

## 2024-01-24 DIAGNOSIS — K31.9 DISEASE OF STOMACH AND DUODENUM, UNSPECIFIED: ICD-10-CM

## 2024-01-24 DIAGNOSIS — Z86.79 PERSONAL HISTORY OF OTHER DISEASES OF THE CIRCULATORY SYSTEM: ICD-10-CM

## 2024-01-24 PROCEDURE — 99204 OFFICE O/P NEW MOD 45 MIN: CPT

## 2024-01-24 RX ORDER — DAPAGLIFLOZIN 5 MG/1
5 TABLET, FILM COATED ORAL
Refills: 0 | Status: ACTIVE | COMMUNITY

## 2024-01-24 RX ORDER — CEPHALEXIN 250 MG
CAPSULE ORAL
Refills: 0 | Status: ACTIVE | COMMUNITY

## 2024-01-24 RX ORDER — ATORVASTATIN CALCIUM 10 MG/1
10 TABLET, FILM COATED ORAL
Refills: 0 | Status: ACTIVE | COMMUNITY

## 2024-01-24 RX ORDER — ICOSAPENT ETHYL 1000 MG/1
1 CAPSULE ORAL
Refills: 0 | Status: ACTIVE | COMMUNITY

## 2024-01-24 RX ORDER — AMLODIPINE BESYLATE 5 MG/1
5 TABLET ORAL
Refills: 0 | Status: ACTIVE | COMMUNITY

## 2024-01-24 RX ORDER — FAMOTIDINE 40 MG/1
40 TABLET, FILM COATED ORAL
Refills: 0 | Status: ACTIVE | COMMUNITY

## 2024-01-24 NOTE — REASON FOR VISIT
[Initial Evaluation] : an initial evaluation [Family Member] : family member [[employer] ] : provided by [employer]   [Interpreters_IDNumber] : [id number] [Interpreters_FullName] : Jd [FreeTextEntry3] : Qatari [TWNoteComboBox1] : Other

## 2024-01-24 NOTE — ASSESSMENT
[FreeTextEntry1] : Patient is a 78 year old male with a history of gastric adenocarcinoma s/p partial gastrectomy in 2008, DMII, HTN and HLD who presents due to referral from Dr. Mario Crocker for evaluation due to gastric lesion.  Patient to undergo upper endoscopy with possible EMR of gastric lesion at [facility].  R/B/C of procedure discussed with patient.  Escort for the procedure also reviewed.  Cardiac evaluation from Dr. John Chance scanned into the chart.  Recent labs from 01/10/2024 also scanned into the chart.  Hernán IMLLER; PA, am scribing for and in the presence of Dr. León Villanueva the following sections: history of present illness, past medical/family/social history; review of systems; vital signs; physical exam; disposition.  León MILLER MD, personally performed the services described in the documentation, reviewed the documentation recorded by the scribe in my presence and it accurately and completely records my words and actions.

## 2024-01-24 NOTE — PHYSICAL EXAM
[Alert] : alert [No Respiratory Distress] : no respiratory distress [Abdomen Tenderness] : non-tender [Abdomen Soft] : soft [Oriented To Time, Place, And Person] : oriented to person, place, and time

## 2024-01-24 NOTE — HISTORY OF PRESENT ILLNESS
[FreeTextEntry1] : Patient is a 78 year old male with a history of gastric adenocarcinoma s/p partial gastrectomy in 2008, DMII, HTN and HLD who presents due to referral from Dr. Mario Crocker for evaluation due to gastric lesion.  Patient states that in 2008, he underwent surgical resection, one round of chemotherapy and 20 pounds of radiation due to stage 3A gastric adenocarcinoma (Signet cell).  Patient recently underwent surveillance upper endoscopy on 07/12/2022 with Dr. Mario Crocker that showed grade A Esophagitis, atrophic appearing gastritis, and 1.5 cm lobular lesion in the gastric body s/p biopsy.  Pathology showed polypoid portion of the inflamed granulation tissue and fibrinopurulent exudate; no malignancy present.  Patient denies nausea, vomiting, abdominal pain, changes in bowel habits, dark stool and BRBPR.  Patient admits to intact appetite and stable weight.  Patient reports a positive family history of gastric cancer in his father.  Patient denies family history of colon and pancreatic cancer.

## 2024-02-21 ENCOUNTER — NON-APPOINTMENT (OUTPATIENT)
Age: 79
End: 2024-02-21

## 2024-02-26 ENCOUNTER — APPOINTMENT (OUTPATIENT)
Dept: GASTROENTEROLOGY | Facility: HOSPITAL | Age: 79
End: 2024-02-26

## 2024-02-26 ENCOUNTER — RESULT REVIEW (OUTPATIENT)
Age: 79
End: 2024-02-26

## 2024-02-26 ENCOUNTER — OUTPATIENT (OUTPATIENT)
Dept: OUTPATIENT SERVICES | Facility: HOSPITAL | Age: 79
LOS: 1 days | Discharge: HOME | End: 2024-02-26
Payer: MEDICARE

## 2024-02-26 VITALS — WEIGHT: 119.93 LBS | HEIGHT: 62 IN

## 2024-02-26 DIAGNOSIS — Z90.3 ACQUIRED ABSENCE OF STOMACH [PART OF]: Chronic | ICD-10-CM

## 2024-02-26 LAB — GLUCOSE BLDC GLUCOMTR-MCNC: 96 MG/DL — SIGNIFICANT CHANGE UP (ref 70–99)

## 2024-02-26 PROCEDURE — 43251 EGD REMOVE LESION SNARE: CPT

## 2024-02-26 PROCEDURE — 43254 EGD ENDO MUCOSAL RESECTION: CPT | Mod: GC

## 2024-02-26 PROCEDURE — 43236 UPPR GI SCOPE W/SUBMUC INJ: CPT

## 2024-02-26 PROCEDURE — 88305 TISSUE EXAM BY PATHOLOGIST: CPT

## 2024-02-26 PROCEDURE — 88305 TISSUE EXAM BY PATHOLOGIST: CPT | Mod: 26

## 2024-02-26 PROCEDURE — C1889: CPT

## 2024-02-26 PROCEDURE — 82962 GLUCOSE BLOOD TEST: CPT

## 2024-02-26 DEVICE — CLIP RESOLUTION 360 235CM: Type: IMPLANTABLE DEVICE | Status: FUNCTIONAL

## 2024-02-26 RX ORDER — OMEPRAZOLE 40 MG/1
40 CAPSULE, DELAYED RELEASE ORAL
Qty: 14 | Refills: 0 | Status: ACTIVE | COMMUNITY
Start: 2024-02-26 | End: 1900-01-01

## 2024-02-26 RX ORDER — SUCRALFATE 1 G/1
1 TABLET ORAL
Qty: 56 | Refills: 0 | Status: ACTIVE | COMMUNITY
Start: 2024-02-26 | End: 1900-01-01

## 2024-02-28 LAB — SURGICAL PATHOLOGY STUDY: SIGNIFICANT CHANGE UP

## 2024-03-06 ENCOUNTER — NON-APPOINTMENT (OUTPATIENT)
Age: 79
End: 2024-03-06

## 2024-08-12 ENCOUNTER — NON-APPOINTMENT (OUTPATIENT)
Age: 79
End: 2024-08-12

## 2024-08-19 ENCOUNTER — RESULT REVIEW (OUTPATIENT)
Age: 79
End: 2024-08-19

## 2024-08-19 ENCOUNTER — APPOINTMENT (OUTPATIENT)
Dept: GASTROENTEROLOGY | Facility: HOSPITAL | Age: 79
End: 2024-08-19

## 2024-08-19 ENCOUNTER — TRANSCRIPTION ENCOUNTER (OUTPATIENT)
Age: 79
End: 2024-08-19

## 2024-08-19 ENCOUNTER — NON-APPOINTMENT (OUTPATIENT)
Age: 79
End: 2024-08-19

## 2024-08-21 ENCOUNTER — NON-APPOINTMENT (OUTPATIENT)
Age: 79
End: 2024-08-21

## 2025-07-09 ENCOUNTER — NON-APPOINTMENT (OUTPATIENT)
Age: 80
End: 2025-07-09

## 2025-07-09 ENCOUNTER — APPOINTMENT (OUTPATIENT)
Dept: GASTROENTEROLOGY | Facility: CLINIC | Age: 80
End: 2025-07-09
Payer: MEDICARE

## 2025-07-09 VITALS
DIASTOLIC BLOOD PRESSURE: 79 MMHG | SYSTOLIC BLOOD PRESSURE: 126 MMHG | OXYGEN SATURATION: 98 % | TEMPERATURE: 97.8 F | BODY MASS INDEX: 22.63 KG/M2 | HEART RATE: 79 BPM | HEIGHT: 62 IN | WEIGHT: 123 LBS

## 2025-07-09 PROCEDURE — 99214 OFFICE O/P EST MOD 30 MIN: CPT

## 2025-07-10 LAB
ANION GAP SERPL CALC-SCNC: 12 MMOL/L
BUN SERPL-MCNC: 29 MG/DL
CALCIUM SERPL-MCNC: 9.1 MG/DL
CHLORIDE SERPL-SCNC: 109 MMOL/L
CO2 SERPL-SCNC: 22 MMOL/L
CREAT SERPL-MCNC: 1.38 MG/DL
EGFRCR SERPLBLD CKD-EPI 2021: 52 ML/MIN/1.73M2
GLUCOSE SERPL-MCNC: 144 MG/DL
POTASSIUM SERPL-SCNC: 4.9 MMOL/L
SODIUM SERPL-SCNC: 143 MMOL/L

## 2025-08-14 ENCOUNTER — NON-APPOINTMENT (OUTPATIENT)
Age: 80
End: 2025-08-14

## 2025-08-18 ENCOUNTER — NON-APPOINTMENT (OUTPATIENT)
Age: 80
End: 2025-08-18

## 2025-08-25 ENCOUNTER — RESULT REVIEW (OUTPATIENT)
Age: 80
End: 2025-08-25

## 2025-08-25 ENCOUNTER — OUTPATIENT (OUTPATIENT)
Dept: OUTPATIENT SERVICES | Facility: HOSPITAL | Age: 80
LOS: 1 days | Discharge: HOME | End: 2025-08-25
Payer: MEDICARE

## 2025-08-25 ENCOUNTER — APPOINTMENT (OUTPATIENT)
Dept: GASTROENTEROLOGY | Facility: HOSPITAL | Age: 80
End: 2025-08-25

## 2025-08-25 VITALS
RESPIRATION RATE: 18 BRPM | WEIGHT: 125 LBS | DIASTOLIC BLOOD PRESSURE: 82 MMHG | HEART RATE: 77 BPM | HEIGHT: 60 IN | SYSTOLIC BLOOD PRESSURE: 165 MMHG | TEMPERATURE: 98 F | OXYGEN SATURATION: 99 %

## 2025-08-25 DIAGNOSIS — Z90.3 ACQUIRED ABSENCE OF STOMACH [PART OF]: Chronic | ICD-10-CM

## 2025-08-25 PROCEDURE — 44361 SMALL BOWEL ENDOSCOPY/BIOPSY: CPT

## 2025-08-25 PROCEDURE — 88305 TISSUE EXAM BY PATHOLOGIST: CPT | Mod: 26

## 2025-08-25 PROCEDURE — 88305 TISSUE EXAM BY PATHOLOGIST: CPT

## 2025-08-25 PROCEDURE — 43239 EGD BIOPSY SINGLE/MULTIPLE: CPT

## 2025-08-27 ENCOUNTER — NON-APPOINTMENT (OUTPATIENT)
Age: 80
End: 2025-08-27

## (undated) DEVICE — SNARE ENDO POLY CAPTIVATOR II 33MM

## (undated) DEVICE — ELCTR GROUNDING PAD ADULT COVIDIEN

## (undated) DEVICE — Device

## (undated) DEVICE — NET RETRIEVAL RESCUENET 30MM

## (undated) DEVICE — FORCEP RADIAL JAW 4 W NDL 2.2MM 2.8MM 240CM ORANGE DISP